# Patient Record
Sex: MALE | Race: OTHER | NOT HISPANIC OR LATINO
[De-identification: names, ages, dates, MRNs, and addresses within clinical notes are randomized per-mention and may not be internally consistent; named-entity substitution may affect disease eponyms.]

---

## 2020-12-07 PROBLEM — Z00.00 ENCOUNTER FOR PREVENTIVE HEALTH EXAMINATION: Status: ACTIVE | Noted: 2020-12-07

## 2020-12-09 ENCOUNTER — APPOINTMENT (OUTPATIENT)
Dept: SURGERY | Facility: CLINIC | Age: 70
End: 2020-12-09
Payer: MEDICARE

## 2020-12-09 VITALS
RESPIRATION RATE: 15 BRPM | SYSTOLIC BLOOD PRESSURE: 161 MMHG | DIASTOLIC BLOOD PRESSURE: 100 MMHG | OXYGEN SATURATION: 98 % | HEART RATE: 80 BPM

## 2020-12-09 VITALS — BODY MASS INDEX: 27.29 KG/M2 | HEIGHT: 62.99 IN | WEIGHT: 154 LBS

## 2020-12-09 DIAGNOSIS — Z86.79 PERSONAL HISTORY OF OTHER DISEASES OF THE CIRCULATORY SYSTEM: ICD-10-CM

## 2020-12-09 PROCEDURE — 99072 ADDL SUPL MATRL&STAF TM PHE: CPT

## 2020-12-09 PROCEDURE — 99205 OFFICE O/P NEW HI 60 MIN: CPT

## 2020-12-09 RX ORDER — AMLODIPINE BESYLATE 5 MG/1
5 TABLET ORAL
Refills: 0 | Status: ACTIVE | COMMUNITY

## 2020-12-09 NOTE — PLAN
[FreeTextEntry1] : Advised elective repair in ambulatory OR. Discussed with patient and her daughter (on the phone, as ) nature of, indications for and risks/benefits of surgery.

## 2020-12-09 NOTE — HISTORY OF PRESENT ILLNESS
[de-identified] : Harleen is a 71 y/o male here for consultation visit. Ultrasound from 9/10/20 demonstrated gallbladder polyps measuring up to 5 mm.  [de-identified] : About 3 weeks ago patient first noted a bulge in the left groin. Seen by his PCP and felt to have a hernia. No pain or tenderness and no abdominal symptoms or change in bowel habits noted.

## 2020-12-09 NOTE — PHYSICAL EXAM
[Normal Breath Sounds] : Normal breath sounds [Normal Heart Sounds] : normal heart sounds [Normal Rate and Rhythm] : normal rate and rhythm [No Rash or Lesion] : No rash or lesion [Alert] : alert [Oriented to Person] : oriented to person [Oriented to Place] : oriented to place [Oriented to Time] : oriented to time [Calm] : calm [JVD] : no jugular venous distention  [de-identified] : Well nourished male, in no apparent distress [de-identified] : WNL [de-identified] : Soft, nondistended, nontender. No palpable mass or organomegaly. Right groin-  no palpable hernia. Left groin-  moderate sized, non-tender, reducible inguinal hernia. [de-identified] : Full ROM

## 2020-12-24 ENCOUNTER — OUTPATIENT (OUTPATIENT)
Dept: OUTPATIENT SERVICES | Facility: HOSPITAL | Age: 70
LOS: 1 days | End: 2020-12-24
Payer: MEDICARE

## 2020-12-24 VITALS
WEIGHT: 136.69 LBS | HEART RATE: 78 BPM | DIASTOLIC BLOOD PRESSURE: 89 MMHG | OXYGEN SATURATION: 99 % | HEIGHT: 62.75 IN | RESPIRATION RATE: 18 BRPM | SYSTOLIC BLOOD PRESSURE: 165 MMHG | TEMPERATURE: 97 F

## 2020-12-24 DIAGNOSIS — Z98.890 OTHER SPECIFIED POSTPROCEDURAL STATES: Chronic | ICD-10-CM

## 2020-12-24 DIAGNOSIS — I10 ESSENTIAL (PRIMARY) HYPERTENSION: ICD-10-CM

## 2020-12-24 DIAGNOSIS — K40.90 UNILATERAL INGUINAL HERNIA, WITHOUT OBSTRUCTION OR GANGRENE, NOT SPECIFIED AS RECURRENT: ICD-10-CM

## 2020-12-24 DIAGNOSIS — Z01.818 ENCOUNTER FOR OTHER PREPROCEDURAL EXAMINATION: ICD-10-CM

## 2020-12-24 LAB
ANION GAP SERPL CALC-SCNC: 12 MMOL/L — SIGNIFICANT CHANGE UP (ref 5–17)
BUN SERPL-MCNC: 14 MG/DL — SIGNIFICANT CHANGE UP (ref 7–23)
CALCIUM SERPL-MCNC: 9.6 MG/DL — SIGNIFICANT CHANGE UP (ref 8.4–10.5)
CHLORIDE SERPL-SCNC: 104 MMOL/L — SIGNIFICANT CHANGE UP (ref 96–108)
CO2 SERPL-SCNC: 23 MMOL/L — SIGNIFICANT CHANGE UP (ref 22–31)
CREAT SERPL-MCNC: 0.97 MG/DL — SIGNIFICANT CHANGE UP (ref 0.5–1.3)
GLUCOSE SERPL-MCNC: 119 MG/DL — HIGH (ref 70–99)
HCT VFR BLD CALC: 45.2 % — SIGNIFICANT CHANGE UP (ref 39–50)
HGB BLD-MCNC: 15.3 G/DL — SIGNIFICANT CHANGE UP (ref 13–17)
MCHC RBC-ENTMCNC: 29.5 PG — SIGNIFICANT CHANGE UP (ref 27–34)
MCHC RBC-ENTMCNC: 33.8 GM/DL — SIGNIFICANT CHANGE UP (ref 32–36)
MCV RBC AUTO: 87.3 FL — SIGNIFICANT CHANGE UP (ref 80–100)
NRBC # BLD: 0 /100 WBCS — SIGNIFICANT CHANGE UP (ref 0–0)
PLATELET # BLD AUTO: 263 K/UL — SIGNIFICANT CHANGE UP (ref 150–400)
POTASSIUM SERPL-MCNC: 3.9 MMOL/L — SIGNIFICANT CHANGE UP (ref 3.5–5.3)
POTASSIUM SERPL-SCNC: 3.9 MMOL/L — SIGNIFICANT CHANGE UP (ref 3.5–5.3)
RBC # BLD: 5.18 M/UL — SIGNIFICANT CHANGE UP (ref 4.2–5.8)
RBC # FLD: 12.4 % — SIGNIFICANT CHANGE UP (ref 10.3–14.5)
SODIUM SERPL-SCNC: 139 MMOL/L — SIGNIFICANT CHANGE UP (ref 135–145)
WBC # BLD: 5.19 K/UL — SIGNIFICANT CHANGE UP (ref 3.8–10.5)
WBC # FLD AUTO: 5.19 K/UL — SIGNIFICANT CHANGE UP (ref 3.8–10.5)

## 2020-12-24 PROCEDURE — 36415 COLL VENOUS BLD VENIPUNCTURE: CPT

## 2020-12-24 PROCEDURE — G0463: CPT

## 2020-12-24 PROCEDURE — 93005 ELECTROCARDIOGRAM TRACING: CPT

## 2020-12-24 PROCEDURE — 93010 ELECTROCARDIOGRAM REPORT: CPT | Mod: NC

## 2020-12-24 PROCEDURE — 80048 BASIC METABOLIC PNL TOTAL CA: CPT

## 2020-12-24 PROCEDURE — 85027 COMPLETE CBC AUTOMATED: CPT

## 2020-12-24 NOTE — H&P PST ADULT - NEGATIVE GENERAL GENITOURINARY SYMPTOMS
no hematuria/no renal colic/no flank pain L/no flank pain R/no bladder infections/no incontinence/no dysuria/no urinary hesitancy/normal urinary frequency/no nocturia

## 2020-12-24 NOTE — H&P PST ADULT - NSANTHOSAYNRD_GEN_A_CORE
neck 16inches/No. PHAM screening performed.  STOP BANG Legend: 0-2 = LOW Risk; 3-4 = INTERMEDIATE Risk; 5-8 = HIGH Risk

## 2020-12-24 NOTE — H&P PST ADULT - NSICDXPROBLEM_GEN_ALL_CORE_FT
PROBLEM DIAGNOSES  Problem: Unilateral inguinal hernia, without obstruction or gangrene, not specified as recurrent  Assessment and Plan: Pre-op instructions given. Pt verbalized understanding  Chlorhexidine wash instructions given  Pending: M/C for elevated BP with meds + Covidpcr results     Problem: Hypertension  Assessment and Plan: PHAM precaution - stop bang 3  Pt instructed to take meds

## 2020-12-24 NOTE — H&P PST ADULT - HISTORY OF PRESENT ILLNESS
69yo Estonian speaking male with medical h/o Inguinal hernia, and presents today for PST for Left Inguinal Hernia Repair scheduled for 1/7/2020

## 2021-01-04 ENCOUNTER — APPOINTMENT (OUTPATIENT)
Dept: DISASTER EMERGENCY | Facility: CLINIC | Age: 71
End: 2021-01-04

## 2021-01-04 ENCOUNTER — LABORATORY RESULT (OUTPATIENT)
Age: 71
End: 2021-01-04

## 2021-01-06 ENCOUNTER — TRANSCRIPTION ENCOUNTER (OUTPATIENT)
Age: 71
End: 2021-01-06

## 2021-01-07 ENCOUNTER — APPOINTMENT (OUTPATIENT)
Dept: SURGERY | Facility: HOSPITAL | Age: 71
End: 2021-01-07
Payer: MEDICARE

## 2021-01-07 ENCOUNTER — OUTPATIENT (OUTPATIENT)
Dept: OUTPATIENT SERVICES | Facility: HOSPITAL | Age: 71
LOS: 1 days | End: 2021-01-07
Payer: MEDICARE

## 2021-01-07 VITALS
TEMPERATURE: 98 F | SYSTOLIC BLOOD PRESSURE: 143 MMHG | RESPIRATION RATE: 16 BRPM | WEIGHT: 136.69 LBS | HEART RATE: 63 BPM | OXYGEN SATURATION: 98 % | HEIGHT: 62.75 IN | DIASTOLIC BLOOD PRESSURE: 85 MMHG

## 2021-01-07 VITALS
DIASTOLIC BLOOD PRESSURE: 77 MMHG | SYSTOLIC BLOOD PRESSURE: 135 MMHG | OXYGEN SATURATION: 97 % | HEART RATE: 76 BPM | TEMPERATURE: 98 F | RESPIRATION RATE: 16 BRPM

## 2021-01-07 DIAGNOSIS — K40.90 UNILATERAL INGUINAL HERNIA, WITHOUT OBSTRUCTION OR GANGRENE, NOT SPECIFIED AS RECURRENT: ICD-10-CM

## 2021-01-07 DIAGNOSIS — Z98.890 OTHER SPECIFIED POSTPROCEDURAL STATES: Chronic | ICD-10-CM

## 2021-01-07 PROCEDURE — 49505 PRP I/HERN INIT REDUC >5 YR: CPT | Mod: LT

## 2021-01-07 PROCEDURE — C1781: CPT

## 2021-01-07 PROCEDURE — 49505 PRP I/HERN INIT REDUC >5 YR: CPT | Mod: AS,LT

## 2021-01-07 RX ORDER — SODIUM CHLORIDE 9 MG/ML
1000 INJECTION, SOLUTION INTRAVENOUS
Refills: 0 | Status: DISCONTINUED | OUTPATIENT
Start: 2021-01-07 | End: 2021-01-07

## 2021-01-07 RX ORDER — ASCORBIC ACID 60 MG
1 TABLET,CHEWABLE ORAL
Qty: 0 | Refills: 0 | DISCHARGE

## 2021-01-07 RX ORDER — HYDROMORPHONE HYDROCHLORIDE 2 MG/ML
0.25 INJECTION INTRAMUSCULAR; INTRAVENOUS; SUBCUTANEOUS
Refills: 0 | Status: DISCONTINUED | OUTPATIENT
Start: 2021-01-07 | End: 2021-01-07

## 2021-01-07 RX ORDER — ACETAMINOPHEN 500 MG
2 TABLET ORAL
Qty: 0 | Refills: 0 | DISCHARGE

## 2021-01-07 RX ORDER — AMLODIPINE BESYLATE 2.5 MG/1
1 TABLET ORAL
Qty: 0 | Refills: 0 | DISCHARGE

## 2021-01-07 RX ORDER — OXYCODONE HYDROCHLORIDE 5 MG/1
1 TABLET ORAL
Qty: 8 | Refills: 0
Start: 2021-01-07 | End: 2021-01-08

## 2021-01-07 RX ORDER — OXYCODONE HYDROCHLORIDE 5 MG/1
5 TABLET ORAL ONCE
Refills: 0 | Status: DISCONTINUED | OUTPATIENT
Start: 2021-01-07 | End: 2021-01-07

## 2021-01-07 RX ADMIN — HYDROMORPHONE HYDROCHLORIDE 0.25 MILLIGRAM(S): 2 INJECTION INTRAMUSCULAR; INTRAVENOUS; SUBCUTANEOUS at 09:41

## 2021-01-07 RX ADMIN — SODIUM CHLORIDE 50 MILLILITER(S): 9 INJECTION, SOLUTION INTRAVENOUS at 06:38

## 2021-01-07 NOTE — ASU DISCHARGE PLAN (ADULT/PEDIATRIC) - CARE PROVIDER_API CALL
Robert Hudson J  SURGERY  [address]  [place], [place] [id number]  Phone: [phone]  Fax: [phone]  Follow Up Time:

## 2021-01-07 NOTE — ASU DISCHARGE PLAN (ADULT/PEDIATRIC) - NURSING INSTRUCTIONS
drink plenty of fluids,  do not take Oxycodone on empty stomach,  use ice packs 15-20 min on/off as needed

## 2021-01-07 NOTE — ASU DISCHARGE PLAN (ADULT/PEDIATRIC) - PATIENT EDUCATION MATERIALS PROVIED
oxycodone, ice packs application, post inguinal hernia repair/Pre-printed instructions given for other (specify)

## 2021-01-07 NOTE — ASU DISCHARGE PLAN (ADULT/PEDIATRIC) - CALL YOUR DOCTOR IF YOU HAVE ANY OF THE FOLLOWING:
Bleeding that does not stop/Pain not relieved by Medications/Fever greater than (need to indicate Fahrenheit or Celsius)/Wound/Surgical Site with redness, or foul smelling discharge or pus Bleeding that does not stop/Swelling that gets worse/Pain not relieved by Medications/Fever greater than (need to indicate Fahrenheit or Celsius)/Wound/Surgical Site with redness, or foul smelling discharge or pus/Nausea and vomiting that does not stop/Unable to urinate

## 2021-01-07 NOTE — BRIEF OPERATIVE NOTE - NSICDXBRIEFPROCEDURE_GEN_ALL_CORE_FT
PROCEDURES:  Repair, hernia, inguinal, open, using mesh, adult 07-Jan-2021 08:53:28 repair left  inguinal hernia Raven Purcell

## 2021-01-08 PROBLEM — I10 ESSENTIAL (PRIMARY) HYPERTENSION: Chronic | Status: ACTIVE | Noted: 2020-12-24

## 2021-01-08 PROBLEM — Z87.19 PERSONAL HISTORY OF OTHER DISEASES OF THE DIGESTIVE SYSTEM: Chronic | Status: ACTIVE | Noted: 2020-12-24

## 2021-01-19 PROBLEM — Z09 POSTOPERATIVE EXAMINATION: Status: ACTIVE | Noted: 2021-01-19

## 2021-01-20 ENCOUNTER — APPOINTMENT (OUTPATIENT)
Dept: SURGERY | Facility: CLINIC | Age: 71
End: 2021-01-20
Payer: MEDICARE

## 2021-01-20 VITALS
HEART RATE: 86 BPM | SYSTOLIC BLOOD PRESSURE: 151 MMHG | DIASTOLIC BLOOD PRESSURE: 95 MMHG | TEMPERATURE: 97.3 F | RESPIRATION RATE: 15 BRPM | OXYGEN SATURATION: 99 %

## 2021-01-20 DIAGNOSIS — Z09 ENCOUNTER FOR FOLLOW-UP EXAMINATION AFTER COMPLETED TREATMENT FOR CONDITIONS OTHER THAN MALIGNANT NEOPLASM: ICD-10-CM

## 2021-01-20 DIAGNOSIS — K40.90 UNILATERAL INGUINAL HERNIA, W/OUT OBSTRUCTION OR GANGRENE, NOT SPECIFIED AS RECURRENT: ICD-10-CM

## 2021-01-20 PROCEDURE — 99024 POSTOP FOLLOW-UP VISIT: CPT

## 2021-01-20 NOTE — PLAN
[FreeTextEntry1] : Patient to continue to liberalize activities as tolerated and return here p.r.n.  Reassured that residual discomfort should resolve completely over the next several weeks.

## 2021-01-20 NOTE — PHYSICAL EXAM
[de-identified] : Soft, nondistended, nontender. Left groin incision healing very well with normal ridge. Repair intact. No palpable seroma or signs of infection. Left testicle normal.

## 2021-01-20 NOTE — HISTORY OF PRESENT ILLNESS
[de-identified] : Vijay is a 70 y/o male here for post-operative visit. 1/7/21- left inguinal hernia repair with medium oval.  [de-identified] : Status post left inguinal hernia repair on 1/7/21. At present has mild residual discomfort.

## 2021-11-02 NOTE — ASU PATIENT PROFILE, ADULT - NSALCOHOLAMT_GEN_A_CORE_SD
Amber Zavala is a 44 year old male with complaints of a chemical burn to his groin that happened David noc. Pt states he was working with Potassium Hydroxide. Pt c/o redness, swelling and difficulty urinating. Onset of symptoms: 2 day(s) ago and lasting to the present time.    He is alert and orientated.    Visit Vitals  BP (!) 154/85   Pulse 68   Temp 97.7 °F (36.5 °C) (Tympanic)   Resp 18   SpO2 100%     Past Medical History:   Diagnosis Date   • Anxiety    • Diet-controlled diabetes mellitus (CMS/HCC)     Pt had weight loss, no longer on insulin/metformin   • Failed moderate sedation during procedure    • Hemorrhoids    • History of heart murmur in childhood    • Substance abuse (CMS/HCC)     marijuana daily   • Wears glasses      Past Surgical History:   Procedure Laterality Date   • Hemorrhoid surgery  05/07/2018   • Hemorrhoid surgery  01/07/2019   • Wrist surgery Left 01/15/2020    Exploration of left wrist laceration.Radial artery branch ligation.Primary repair of EPL, ECRL, and EPB tendons. Repair of dorsal radial sensory nerve.      Current Outpatient Medications   Medication Sig Dispense Refill   • promethazine-dextromethorphan (PROMETHAZINE-DM) 6.25-15 MG/5ML syrup Take 5 mLs by mouth 4 times daily as needed for Cough. 118 mL 0   • albuterol 108 (90 Base) MCG/ACT inhaler Inhale 2 puffs into the lungs every 4 hours as needed for Shortness of Breath or Wheezing. 1 each 0   • docusate calcium (SURFAK) 240 MG capsule Take 1 capsule by mouth daily. 30 capsule 0   • HYDROcodone-acetaminophen (Norco) 5-325 MG per tablet Take 2 tablets by mouth every 6 hours as needed for Pain. 28 tablet 0   • acetaminophen (TYLENOL) 325 MG tablet Take 650 mg by mouth every 6 hours as needed.       No current facility-administered medications for this visit.     ALLERGIES:  No Known Allergies    Per Urgent Care guidelines and the Urgent Care provider, Dr. Saini , he is to be transferred to the Emergency Department.  Patient  agrees to transfer.  Report called to [place], T[facility] ED triage nurse.  Patient transported to Emergency Department via  Private vehicle in stable condition.    1-2 drinks

## 2024-03-22 ENCOUNTER — APPOINTMENT (OUTPATIENT)
Dept: PULMONOLOGY | Facility: CLINIC | Age: 74
End: 2024-03-22

## 2025-03-26 NOTE — H&P PST ADULT - CARDIOVASCULAR COMMENTS
Want to Say “Thank You” to a Nurse?  The [employer] Award® was created in memory of KAI Kimball by his family to say thank you to nurses who provide an outstanding level of care.    Submit a nomination using any method below.     OR    [email or web address]  Or visit the Resource section   on your [employer] adrian       h/o htn